# Patient Record
Sex: FEMALE | Race: BLACK OR AFRICAN AMERICAN | URBAN - METROPOLITAN AREA
[De-identification: names, ages, dates, MRNs, and addresses within clinical notes are randomized per-mention and may not be internally consistent; named-entity substitution may affect disease eponyms.]

---

## 2022-01-20 ENCOUNTER — LAB OUTSIDE AN ENCOUNTER (OUTPATIENT)
Dept: URBAN - METROPOLITAN AREA CLINIC 17 | Facility: CLINIC | Age: 22
End: 2022-01-20

## 2022-01-20 ENCOUNTER — OFFICE VISIT (OUTPATIENT)
Dept: URBAN - METROPOLITAN AREA CLINIC 17 | Facility: CLINIC | Age: 22
End: 2022-01-20
Payer: COMMERCIAL

## 2022-01-20 ENCOUNTER — WEB ENCOUNTER (OUTPATIENT)
Dept: URBAN - METROPOLITAN AREA CLINIC 17 | Facility: CLINIC | Age: 22
End: 2022-01-20

## 2022-01-20 VITALS
DIASTOLIC BLOOD PRESSURE: 84 MMHG | HEART RATE: 91 BPM | WEIGHT: 111.8 LBS | BODY MASS INDEX: 16.94 KG/M2 | SYSTOLIC BLOOD PRESSURE: 115 MMHG | HEIGHT: 68 IN | TEMPERATURE: 97.4 F

## 2022-01-20 DIAGNOSIS — R19.4 CHANGE IN BOWEL HABITS: ICD-10-CM

## 2022-01-20 DIAGNOSIS — Z83.79 FAMILY HISTORY OF ULCERATIVE COLITIS: ICD-10-CM

## 2022-01-20 DIAGNOSIS — R11.12 PROJECTILE VOMITING WITH NAUSEA: ICD-10-CM

## 2022-01-20 DIAGNOSIS — R63.4 WEIGHT LOSS: ICD-10-CM

## 2022-01-20 PROBLEM — 37344009: Status: ACTIVE | Noted: 2022-01-20

## 2022-01-20 PROCEDURE — 99244 OFF/OP CNSLTJ NEW/EST MOD 40: CPT | Performed by: INTERNAL MEDICINE

## 2022-01-20 PROCEDURE — 99204 OFFICE O/P NEW MOD 45 MIN: CPT | Performed by: INTERNAL MEDICINE

## 2022-01-20 NOTE — HPI-TODAY'S VISIT:
1/20/22 20 yo lady pt of Dr Lubin.  Here with girlfriend.  She has had stomach pains on and off for about 2 years.  Usually lower abdominal cramping.  Her usual bowel habit would be 3-4 times a week. She would not feel constipated "I thought everything was normal". Says for at least a month (starting a month ago) she was constipated - she would have the urge to go, small volume stools and incomplete evacuation She was bloated  and gassy as a result of this.  AFter about 2 weeks, she took 2 stool softeners and magnesium. She then had diarrheal stools for a week and not stools are chunky. She wiped and saw some blood on the tissue a few times.  Her stomach will cramp intermittently randomly and she will feel like her stomach is churning.  Having a BM makes her feel better.  Her mum has ulcerative colitis.  Pt has lost about 10-15 lbs over the last month. Her appetite has not been very good. No upper abdominal discomfort. She threw up spinach about 2 weeks ago in the middle of the night. Last year she vomited for "3 days straight" She denies reflux. She smokes marijuana regularly for the past 5 years.  labs from 2/2021 from ER visit showed nl CBC except wbc 12 and nl CMP except bili 1.3

## 2022-01-21 LAB
A/G RATIO: 1.9
ALBUMIN: 4.9
ALKALINE PHOSPHATASE: 72
ALT (SGPT): 28
AST (SGOT): 17
BILIRUBIN, TOTAL: 0.4
BUN/CREATININE RATIO: 14
BUN: 12
C-REACTIVE PROTEIN, QUANT: <1
CALCIUM: 10
CARBON DIOXIDE, TOTAL: 21
CHLORIDE: 103
CREATININE: 0.84
EGFR IF AFRICN AM: 116
EGFR IF NONAFRICN AM: 100
GLOBULIN, TOTAL: 2.6
GLUCOSE: 81
HEMATOCRIT: 42.9
HEMOGLOBIN: 13.7
MCH: 28.2
MCHC: 31.9
MCV: 88
NRBC: (no result)
PLATELETS: 220
POTASSIUM: 4.3
PROTEIN, TOTAL: 7.5
RBC: 4.86
RDW: 13.6
SODIUM: 141
WBC: 9.6

## 2022-01-31 ENCOUNTER — OFFICE VISIT (OUTPATIENT)
Dept: URBAN - METROPOLITAN AREA SURGERY CENTER 16 | Facility: SURGERY CENTER | Age: 22
End: 2022-01-31

## 2022-02-28 ENCOUNTER — DASHBOARD ENCOUNTERS (OUTPATIENT)
Age: 22
End: 2022-02-28

## 2022-03-03 ENCOUNTER — OFFICE VISIT (OUTPATIENT)
Dept: URBAN - METROPOLITAN AREA CLINIC 17 | Facility: CLINIC | Age: 22
End: 2022-03-03